# Patient Record
Sex: MALE | Race: WHITE | NOT HISPANIC OR LATINO | Employment: FULL TIME | URBAN - METROPOLITAN AREA
[De-identification: names, ages, dates, MRNs, and addresses within clinical notes are randomized per-mention and may not be internally consistent; named-entity substitution may affect disease eponyms.]

---

## 2019-05-16 ENCOUNTER — HOSPITAL ENCOUNTER (EMERGENCY)
Facility: HOSPITAL | Age: 39
Discharge: HOME | End: 2019-05-16
Attending: EMERGENCY MEDICINE | Admitting: EMERGENCY MEDICINE
Payer: COMMERCIAL

## 2019-05-16 ENCOUNTER — OFFICE VISIT (OUTPATIENT)
Dept: URGENT CARE | Age: 39
End: 2019-05-16
Payer: COMMERCIAL

## 2019-05-16 ENCOUNTER — APPOINTMENT (EMERGENCY)
Dept: ULTRASOUND IMAGING | Facility: HOSPITAL | Age: 39
End: 2019-05-16
Payer: COMMERCIAL

## 2019-05-16 VITALS
RESPIRATION RATE: 16 BRPM | TEMPERATURE: 98.5 F | DIASTOLIC BLOOD PRESSURE: 80 MMHG | OXYGEN SATURATION: 96 % | HEIGHT: 76 IN | BODY MASS INDEX: 24.6 KG/M2 | HEART RATE: 61 BPM | SYSTOLIC BLOOD PRESSURE: 132 MMHG | WEIGHT: 202 LBS

## 2019-05-16 VITALS
RESPIRATION RATE: 16 BRPM | TEMPERATURE: 98.1 F | DIASTOLIC BLOOD PRESSURE: 92 MMHG | HEART RATE: 70 BPM | WEIGHT: 202 LBS | OXYGEN SATURATION: 100 % | BODY MASS INDEX: 24.59 KG/M2 | SYSTOLIC BLOOD PRESSURE: 147 MMHG

## 2019-05-16 DIAGNOSIS — M25.462 SWELLING OF LEFT KNEE JOINT: Primary | ICD-10-CM

## 2019-05-16 DIAGNOSIS — M79.89 PAIN AND SWELLING OF LEFT LOWER LEG: Primary | ICD-10-CM

## 2019-05-16 DIAGNOSIS — M79.662 PAIN AND SWELLING OF LEFT LOWER LEG: Primary | ICD-10-CM

## 2019-05-16 PROCEDURE — 93971 EXTREMITY STUDY: CPT

## 2019-05-16 PROCEDURE — 93971 EXTREMITY STUDY: CPT | Performed by: SURGERY

## 2019-05-16 PROCEDURE — 99203 OFFICE O/P NEW LOW 30 MIN: CPT | Performed by: NURSE PRACTITIONER

## 2019-05-16 PROCEDURE — 99284 EMERGENCY DEPT VISIT MOD MDM: CPT | Performed by: EMERGENCY MEDICINE

## 2019-05-16 PROCEDURE — 99283 EMERGENCY DEPT VISIT LOW MDM: CPT

## 2019-05-16 RX ORDER — PANTOPRAZOLE SODIUM 40 MG/1
40 TABLET, DELAYED RELEASE ORAL
Refills: 0 | COMMUNITY
Start: 2019-02-28 | End: 2020-04-02

## 2020-04-02 DIAGNOSIS — K21.9 GASTRO-ESOPHAGEAL REFLUX DISEASE WITHOUT ESOPHAGITIS: ICD-10-CM

## 2020-04-02 RX ORDER — PANTOPRAZOLE SODIUM 40 MG/1
TABLET, DELAYED RELEASE ORAL
Qty: 21 TABLET | Refills: 0 | Status: SHIPPED | OUTPATIENT
Start: 2020-04-02 | End: 2020-04-20

## 2020-04-18 DIAGNOSIS — K21.9 GASTRO-ESOPHAGEAL REFLUX DISEASE WITHOUT ESOPHAGITIS: ICD-10-CM

## 2020-04-20 RX ORDER — PANTOPRAZOLE SODIUM 40 MG/1
TABLET, DELAYED RELEASE ORAL
Qty: 90 TABLET | Refills: 0 | Status: SHIPPED | OUTPATIENT
Start: 2020-04-20 | End: 2021-11-03

## 2020-05-21 ENCOUNTER — OFFICE VISIT (OUTPATIENT)
Dept: FAMILY MEDICINE CLINIC | Facility: CLINIC | Age: 40
End: 2020-05-21
Payer: COMMERCIAL

## 2020-05-21 VITALS
OXYGEN SATURATION: 98 % | BODY MASS INDEX: 24.96 KG/M2 | DIASTOLIC BLOOD PRESSURE: 84 MMHG | WEIGHT: 205 LBS | SYSTOLIC BLOOD PRESSURE: 124 MMHG | TEMPERATURE: 98.4 F | RESPIRATION RATE: 16 BRPM | HEART RATE: 70 BPM | HEIGHT: 76 IN

## 2020-05-21 DIAGNOSIS — L02.215 PERINEAL ABSCESS: Primary | ICD-10-CM

## 2020-05-21 PROCEDURE — 99213 OFFICE O/P EST LOW 20 MIN: CPT | Performed by: FAMILY MEDICINE

## 2020-05-21 PROCEDURE — 1036F TOBACCO NON-USER: CPT | Performed by: FAMILY MEDICINE

## 2020-05-21 PROCEDURE — 3008F BODY MASS INDEX DOCD: CPT | Performed by: FAMILY MEDICINE

## 2020-05-21 RX ORDER — SULFAMETHOXAZOLE AND TRIMETHOPRIM 800; 160 MG/1; MG/1
1 TABLET ORAL 2 TIMES DAILY
Qty: 14 TABLET | Refills: 0 | Status: SHIPPED | OUTPATIENT
Start: 2020-05-21 | End: 2020-05-28

## 2021-11-03 ENCOUNTER — OFFICE VISIT (OUTPATIENT)
Dept: FAMILY MEDICINE CLINIC | Facility: CLINIC | Age: 41
End: 2021-11-03
Payer: COMMERCIAL

## 2021-11-03 VITALS
DIASTOLIC BLOOD PRESSURE: 74 MMHG | OXYGEN SATURATION: 97 % | WEIGHT: 214 LBS | SYSTOLIC BLOOD PRESSURE: 124 MMHG | BODY MASS INDEX: 26.06 KG/M2 | HEART RATE: 62 BPM | HEIGHT: 76 IN | RESPIRATION RATE: 18 BRPM

## 2021-11-03 DIAGNOSIS — M25.572 LEFT ANKLE PAIN, UNSPECIFIED CHRONICITY: Primary | ICD-10-CM

## 2021-11-03 PROCEDURE — 99213 OFFICE O/P EST LOW 20 MIN: CPT | Performed by: NURSE PRACTITIONER

## 2021-11-03 PROCEDURE — 3725F SCREEN DEPRESSION PERFORMED: CPT | Performed by: NURSE PRACTITIONER

## 2021-11-03 PROCEDURE — 1036F TOBACCO NON-USER: CPT | Performed by: NURSE PRACTITIONER

## 2021-11-03 PROCEDURE — 3008F BODY MASS INDEX DOCD: CPT | Performed by: NURSE PRACTITIONER

## 2023-02-11 ENCOUNTER — OFFICE VISIT (OUTPATIENT)
Dept: URGENT CARE | Facility: CLINIC | Age: 43
End: 2023-02-11

## 2023-02-11 VITALS
OXYGEN SATURATION: 98 % | DIASTOLIC BLOOD PRESSURE: 75 MMHG | BODY MASS INDEX: 27.03 KG/M2 | WEIGHT: 222 LBS | SYSTOLIC BLOOD PRESSURE: 133 MMHG | RESPIRATION RATE: 16 BRPM | TEMPERATURE: 98.5 F | HEART RATE: 68 BPM | HEIGHT: 76 IN

## 2023-02-11 DIAGNOSIS — L01.00 IMPETIGO: Primary | ICD-10-CM

## 2023-02-11 RX ORDER — DOXYCYCLINE 100 MG/1
100 TABLET ORAL 2 TIMES DAILY
Qty: 10 TABLET | Refills: 0 | Status: SHIPPED | OUTPATIENT
Start: 2023-02-11 | End: 2023-02-16

## 2023-02-11 NOTE — PROGRESS NOTES
[facility] [facility]        NAME: Hugo Mendes is a 38 y o  male  : 1980    MRN: [id number]  DATE: 2023  TIME: 11:45 AM    Assessment and Plan   Impetigo [L01 00]  1  Impetigo  doxycycline (ADOXA) 100 MG tablet    mupirocin (BACTROBAN) 2 % ointment            Patient Instructions     --Take oral antibiotic and apply topical antibiotic as prescribed  --Avoid further squeezing  --OTC hydrocortisone cream as needed for itching  --Seek re-evaluation if no resolution/worsening over the next 3-5 days  Chief Complaint     Chief Complaint   Patient presents with   • Abscess     Pt  States that he had a pimple like area on his chin and he popped it  Pt  States that blood and clear fluid came out  Pt  States that he has been putting A&D oint  On it and Vaseline  Pt  States that the area seems to be more red and has a yellow crust to it  History of Present Illness       Here with complaints of "pimple" on chin first noticed 5 days ago, at which time he popped it  Some clear fluid came out  Looking a bit more red, crusty since then  Itchy, mildly tender  No further drainage  No fever  Went to [facility] a couple days ago and they trimmed beard  Wondering if that may have been a factor  Applying A&D ointment  Denies history of skin infections including MRSA  Review of Systems   Review of Systems   Constitutional: Negative for fever  Skin: Positive for rash           Current Medications       Current Outpatient Medications:   •  doxycycline (ADOXA) 100 MG tablet, Take 1 tablet (100 mg total) by mouth 2 (two) times a day for 5 days, Disp: 10 tablet, Rfl: 0  •  mupirocin (BACTROBAN) 2 % ointment, Apply topically 3 (three) times a day, Disp: 22 g, Rfl: 0    Current Allergies     Allergies as of 2023 - Reviewed 2021   Allergen Reaction Noted   • Penicillins Hives 2006            The following portions of the patient's history were reviewed and updated as appropriate: allergies, current medications, past family history, past medical history, past social history, past surgical history and problem list      Past Medical History:   Diagnosis Date   • GERD (gastroesophageal reflux disease)    • IBS (irritable bowel syndrome)        No past surgical history on file  Family History   Problem Relation Age of Onset   • No Known Problems Mother    • No Known Problems Father          Medications have been verified  Objective   /75   Pulse 68   Temp 98 5 °F (36 9 °C)   Resp 16   Ht 6' 4" (1 93 m)   Wt 101 kg (222 lb)   SpO2 98%   BMI 27 02 kg/m²   No LMP for male patient  Physical Exam     Physical Exam  Skin:     Findings: Erythema and rash present  Comments: Chin with 2 x 3 cm oval shaped area of erythematous, mildly raised, indurated, non-tender skin with some overlying elkins crusting  No weeping or drainage noted at this time  No underlying fluctuance  Neurological:      Mental Status: He is alert     Psychiatric:         Mood and Affect: Mood normal

## 2023-02-11 NOTE — PATIENT INSTRUCTIONS
--Take oral antibiotic and apply topical antibiotic as prescribed  --Avoid further squeezing  --OTC hydrocortisone cream as needed for itching  --Seek re-evaluation if no resolution/worsening over the next 3-5 days

## 2024-04-18 ENCOUNTER — TELEPHONE (OUTPATIENT)
Dept: FAMILY MEDICINE CLINIC | Facility: CLINIC | Age: 44
End: 2024-04-18

## 2024-04-18 NOTE — TELEPHONE ENCOUNTER
CAROL to confirm if pt is switching PCP to Dr. Blanco. If not he will need to be rescheduled to get his physical from his PCP, Dr. Veliz

## 2024-04-19 ENCOUNTER — OFFICE VISIT (OUTPATIENT)
Dept: FAMILY MEDICINE CLINIC | Facility: CLINIC | Age: 44
End: 2024-04-19
Payer: COMMERCIAL

## 2024-04-19 VITALS
BODY MASS INDEX: 26.79 KG/M2 | OXYGEN SATURATION: 98 % | WEIGHT: 220 LBS | RESPIRATION RATE: 16 BRPM | SYSTOLIC BLOOD PRESSURE: 150 MMHG | DIASTOLIC BLOOD PRESSURE: 82 MMHG | HEIGHT: 76 IN | HEART RATE: 63 BPM

## 2024-04-19 DIAGNOSIS — R53.83 LOW ENERGY: ICD-10-CM

## 2024-04-19 DIAGNOSIS — Z13.220 SCREENING CHOLESTEROL LEVEL: ICD-10-CM

## 2024-04-19 DIAGNOSIS — Z13.1 SCREENING FOR DIABETES MELLITUS: ICD-10-CM

## 2024-04-19 DIAGNOSIS — Z13.0 SCREENING FOR DEFICIENCY ANEMIA: ICD-10-CM

## 2024-04-19 DIAGNOSIS — Z00.00 ENCOUNTER FOR PREVENTATIVE ADULT HEALTH CARE EXAMINATION: Primary | ICD-10-CM

## 2024-04-19 PROBLEM — M25.572 LEFT ANKLE PAIN: Status: RESOLVED | Noted: 2021-11-03 | Resolved: 2024-04-19

## 2024-04-19 PROCEDURE — 99396 PREV VISIT EST AGE 40-64: CPT | Performed by: FAMILY MEDICINE

## 2024-04-19 NOTE — PROGRESS NOTES
ADULT ANNUAL PHYSICAL  [facility]    NAME: Viral Del Real  AGE: 43 y.o. SEX: male  : 1980     DATE: 2024     Assessment and Plan:     Problem List Items Addressed This Visit    None  Visit Diagnoses       Encounter for preventative adult health care examination    -  Primary    Relevant Orders    CBC and differential    Comprehensive metabolic panel    Testosterone, free, total    Screening cholesterol level        Relevant Orders    Lipid panel    Screening for diabetes mellitus        Relevant Orders    Comprehensive metabolic panel    Screening for deficiency anemia        Relevant Orders    CBC and differential    Low energy        Relevant Orders    Testosterone, free, total          Patient is typically followed by Dr. Veliz.  He comes here for a preventative health examination.  Lab work will review for cell lines, kidney function, liver function, electrolytes, fasting sugar, and cholesterol.  Patient also requested a testosterone be placed.  We briefly went over the pros and cons of exogenous testosterone, he still would like testing.  He will need to follow-up with Dr. Veliz.    Immunizations and preventive care screenings were discussed with patient today. Appropriate education was printed on patient's after visit summary.    Discussed risks and benefits of prostate cancer screening. We discussed the controversial history of PSA screening for prostate cancer in the [place] as well as the risk of over detection and over treatment of prostate cancer by way of PSA screening.  The patient understands that PSA blood testing is an imperfect way to screen for prostate cancer and that elevated PSA levels in the blood may also be caused by infection, inflammation, prostatic trauma or manipulation, urological procedures, or by benign prostatic enlargement.    The role of the digital rectal examination in prostate cancer screening was also  discussed and I discussed with him that there is large interobserver variability in the findings of digital rectal examination.    Counseling:  Alcohol/drug use: discussed moderation in alcohol intake, the recommendations for healthy alcohol use, and avoidance of illicit drug use.  Dental Health: discussed importance of regular tooth brushing, flossing, and dental visits.  Injury prevention: discussed safety/seat belts, safety helmets, smoke detectors, carbon dioxide detectors, and smoking near bedding or upholstery.  Sexual health: discussed sexually transmitted diseases, partner selection, use of condoms, avoidance of unintended pregnancy, and contraceptive alternatives.  Exercise: the importance of regular exercise/physical activity was discussed. Recommend exercise 3-5 times per week for at least 30 minutes.          No follow-ups on file.     Chief Complaint:     Chief Complaint   Patient presents with    Annual Exam      History of Present Illness:     Adult Annual Physical   Patient here for a comprehensive physical exam. The patient reports no problems.  Patient presents today to obtain lab work/proper screening for his age.  He is now greater than 40 and his significant other is requesting that he be evaluated.  Overall he is a healthy individual and has been starting to work out more.  He consumes healthy food on a regular basis.  His only major concern is an area on the right side of his parietal region, there is a small lesion that he cannot see and would like it evaluated.    Diet and Physical Activity  Diet/Nutrition: well balanced diet and consuming 3-5 servings of fruits/vegetables daily.   Exercise: 3-4 times a week on average and 30-60 minutes on average.      Depression Screening  PHQ-2/9 Depression Screening    Little interest or pleasure in doing things: 0 - not at all  Feeling down, depressed, or hopeless: 0 - not at all  PHQ-2 Score: 0  PHQ-2 Interpretation: Negative depression screen        General Health  Sleep: sleeps well and gets 7-8 hours of sleep on average.   Hearing: normal - bilateral.  Vision: goes for regular eye exams and wears glasses.   Dental: no dental visits for >1 year.        Health  Symptoms include: none     Review of Systems:     Review of Systems   Constitutional:  Negative for chills, fever and unexpected weight change.   HENT:  Negative for congestion, ear discharge, ear pain, postnasal drip, rhinorrhea, sinus pressure, sinus pain and sore throat.    Eyes:  Positive for visual disturbance.   Respiratory:  Negative for cough, chest tightness and shortness of breath.    Cardiovascular:  Negative for chest pain and palpitations.   Gastrointestinal:  Positive for diarrhea. Negative for abdominal distention, abdominal pain, blood in stool, constipation, nausea and vomiting. Rectal pain: hx of IBS.  Genitourinary:  Negative for dysuria and frequency.   Neurological:  Positive for headaches (occasionally, stress). Negative for dizziness and light-headedness.   Psychiatric/Behavioral:  Negative for self-injury and suicidal ideas.       Past Medical History:     Past Medical History:   Diagnosis Date    GERD (gastroesophageal reflux disease)     IBS (irritable bowel syndrome)       Past Surgical History:     Past Surgical History:   Procedure Laterality Date    NO PAST SURGERIES        Family History:     Family History   Problem Relation Age of Onset    Clotting disorder Mother     Hypertension Father     Throat cancer Maternal Grandfather 88    Colon cancer Paternal Grandmother 80    Prostate cancer Neg Hx     Testicular cancer Neg Hx       Social History:     Social History     Socioeconomic History    Marital status: Single     Spouse name: None    Number of children: None    Years of education: None    Highest education level: None   Occupational History    Occupation:    Tobacco Use    Smoking status: Never    Smokeless tobacco: Never   Vaping Use    Vaping  "status: Never Used   Substance and Sexual Activity    Alcohol use: Yes     Comment: 2x per month, not weekly.    Drug use: Never    Sexual activity: Yes     Partners: Female   Other Topics Concern    None   Social History Narrative    · Most recent tobacco use screenin2017      · Do you currently or have you served in the [employer]:   No      · Were you activated, into active duty, as a member of the [employer] or as a Reservist:   No      · Exercise level:   Occasional      · Diet:   Regular      · Caffeine intake:   Occasional      · Seat belts used routinely:   Yes      · Sunscreen used routinely:   Yes      · Smoke alarm in home:   Yes      · Advance directive:   No      · Salt Intake:   adds salt to food     As per [place]      Social Determinants of Health     Financial Resource Strain: Not on file   Food Insecurity: Not on file   Transportation Needs: Not on file   Physical Activity: Not on file   Stress: Not on file   Social Connections: Not on file   Intimate Partner Violence: Not on file   Housing Stability: Not on file      Current Medications:     No current outpatient medications on file.     No current facility-administered medications for this visit.      Allergies:     Allergies   Allergen Reactions    Penicillins Hives     Reaction Date: 2006;         Physical Exam:     /82   Pulse 63   Resp 16   Ht 6' 4\" (1.93 m)   Wt 99.8 kg (220 lb)   SpO2 98%   BMI 26.78 kg/m²     Physical Exam  Constitutional:       General: He is not in acute distress.     Appearance: Normal appearance. He is normal weight. He is not ill-appearing, toxic-appearing or diaphoretic.   HENT:      Head: Normocephalic and atraumatic.      Right Ear: Tympanic membrane, ear canal and external ear normal. There is no impacted cerumen.      Left Ear: Tympanic membrane, ear canal and external ear normal. There is no impacted cerumen.      Nose: Nose normal. No congestion or rhinorrhea.      " Mouth/Throat:      Mouth: Mucous membranes are moist.      Pharynx: Oropharynx is clear. No oropharyngeal exudate or posterior oropharyngeal erythema.   Eyes:      General:         Right eye: No discharge.         Left eye: No discharge.      Extraocular Movements: Extraocular movements intact.      Pupils: Pupils are equal, round, and reactive to light.   Cardiovascular:      Rate and Rhythm: Normal rate and regular rhythm.      Heart sounds: Normal heart sounds. No murmur heard.     No friction rub. No gallop.   Pulmonary:      Effort: Pulmonary effort is normal. No respiratory distress.      Breath sounds: Normal breath sounds. No stridor. No wheezing, rhonchi or rales.   Abdominal:      General: Bowel sounds are normal. There is no distension.      Palpations: Abdomen is soft.      Tenderness: There is no abdominal tenderness.   Musculoskeletal:      Cervical back: Neck supple. No tenderness.   Lymphadenopathy:      Cervical: No cervical adenopathy.   Skin:     General: Skin is warm.      Capillary Refill: Capillary refill takes less than 2 seconds.      Findings: Lesion (right, posterior, parital region) present.   Neurological:      Mental Status: He is alert.      Sensory: No sensory deficit (light touch in all 4 extremities).      Motor: No weakness (5/5 in all 4 extremities).      Deep Tendon Reflexes: Reflexes normal (2/4, intact, C5, C6, L4, S1).          Mac Blanco,   [facility]

## 2024-04-27 LAB
ALBUMIN SERPL-MCNC: 4.6 G/DL (ref 4.1–5.1)
ALBUMIN/GLOB SERPL: 1.5 {RATIO} (ref 1.2–2.2)
ALP SERPL-CCNC: 67 IU/L (ref 44–121)
ALT SERPL-CCNC: 18 IU/L (ref 0–44)
AST SERPL-CCNC: 25 IU/L (ref 0–40)
BASOPHILS # BLD AUTO: 0 X10E3/UL (ref 0–0.2)
BASOPHILS NFR BLD AUTO: 0 %
BILIRUB SERPL-MCNC: 0.4 MG/DL (ref 0–1.2)
BUN SERPL-MCNC: 16 MG/DL (ref 6–24)
BUN/CREAT SERPL: 15 (ref 9–20)
CALCIUM SERPL-MCNC: 9.4 MG/DL (ref 8.7–10.2)
CHLORIDE SERPL-SCNC: 101 MMOL/L (ref 96–106)
CHOLEST SERPL-MCNC: 197 MG/DL (ref 100–199)
CO2 SERPL-SCNC: 22 MMOL/L (ref 20–29)
CREAT SERPL-MCNC: 1.09 MG/DL (ref 0.76–1.27)
EGFR: 87 ML/MIN/1.73
EOSINOPHIL # BLD AUTO: 0.4 X10E3/UL (ref 0–0.4)
EOSINOPHIL NFR BLD AUTO: 5 %
ERYTHROCYTE [DISTWIDTH] IN BLOOD BY AUTOMATED COUNT: 12.6 % (ref 11.6–15.4)
GLOBULIN SER-MCNC: 3 G/DL (ref 1.5–4.5)
GLUCOSE SERPL-MCNC: 92 MG/DL (ref 70–99)
HCT VFR BLD AUTO: 49.7 % (ref 37.5–51)
HDLC SERPL-MCNC: 34 MG/DL
HGB BLD-MCNC: 16.6 G/DL (ref 13–17.7)
IMM GRANULOCYTES # BLD: 0 X10E3/UL (ref 0–0.1)
IMM GRANULOCYTES NFR BLD: 0 %
LDLC SERPL CALC-MCNC: 105 MG/DL (ref 0–99)
LYMPHOCYTES # BLD AUTO: 2.6 X10E3/UL (ref 0.7–3.1)
LYMPHOCYTES NFR BLD AUTO: 34 %
MCH RBC QN AUTO: 30.2 PG (ref 26.6–33)
MCHC RBC AUTO-ENTMCNC: 33.4 G/DL (ref 31.5–35.7)
MCV RBC AUTO: 90 FL (ref 79–97)
MONOCYTES # BLD AUTO: 0.8 X10E3/UL (ref 0.1–0.9)
MONOCYTES NFR BLD AUTO: 11 %
NEUTROPHILS # BLD AUTO: 3.8 X10E3/UL (ref 1.4–7)
NEUTROPHILS NFR BLD AUTO: 50 %
PLATELET # BLD AUTO: 265 X10E3/UL (ref 150–450)
POTASSIUM SERPL-SCNC: 4.3 MMOL/L (ref 3.5–5.2)
PROT SERPL-MCNC: 7.6 G/DL (ref 6–8.5)
RBC # BLD AUTO: 5.5 X10E6/UL (ref 4.14–5.8)
SL AMB VLDL CHOLESTEROL CALC: 58 MG/DL (ref 5–40)
SODIUM SERPL-SCNC: 139 MMOL/L (ref 134–144)
TESTOST FREE SERPL-MCNC: 12 PG/ML (ref 6.8–21.5)
TESTOST SERPL-MCNC: 464 NG/DL (ref 264–916)
TRIGL SERPL-MCNC: 341 MG/DL (ref 0–149)
WBC # BLD AUTO: 7.7 X10E3/UL (ref 3.4–10.8)

## 2024-05-02 ENCOUNTER — TELEPHONE (OUTPATIENT)
Dept: FAMILY MEDICINE CLINIC | Facility: CLINIC | Age: 44
End: 2024-05-02

## 2024-05-02 NOTE — TELEPHONE ENCOUNTER
Patient informed him that I will be sending a [employer] message with further details.  Thankfully most of his labs are within normal limits, although his cholesterol especially his triglycerides are significantly elevated.  I told him he could either reach out via Nativo or follow-up with his primary care provider, Dr. Veliz, in the office.

## 2024-05-17 ENCOUNTER — OFFICE VISIT (OUTPATIENT)
Dept: FAMILY MEDICINE CLINIC | Facility: CLINIC | Age: 44
End: 2024-05-17

## 2024-05-17 VITALS
OXYGEN SATURATION: 98 % | DIASTOLIC BLOOD PRESSURE: 82 MMHG | BODY MASS INDEX: 26.42 KG/M2 | WEIGHT: 217 LBS | HEIGHT: 76 IN | SYSTOLIC BLOOD PRESSURE: 136 MMHG | HEART RATE: 79 BPM | RESPIRATION RATE: 16 BRPM

## 2024-05-17 DIAGNOSIS — E55.9 VITAMIN D DEFICIENCY: ICD-10-CM

## 2024-05-17 DIAGNOSIS — E78.2 MIXED HYPERLIPIDEMIA: Primary | ICD-10-CM

## 2024-05-17 NOTE — PROGRESS NOTES
Ambulatory Visit  Name: Viral Del Real      : 1980      MRN: [id number]  Encounter Provider: Paul Veliz MD  Encounter Date: 2024   Encounter department: [facility]    Assessment & Plan  1. Annual physical examination.  The patient was counseled to maintain a healthy diet, ensure adequate hydration, and maintain regular appointments with the ophthalmologist and dentist.    2. Potential lipoma or folliculitis.  The nodule could potentially be a lipoma.    Follow-up  The patient is scheduled for a follow-up visit in 3 months, during which his vitamin D levels will be reassessed.  No orders of the defined types were placed in this encounter.         History of Present Illness     History of Present Illness  The patient is a 43-year-old male who is here for follow-up physical.    The patient's recent blood work revealed elevated triglyceride levels, which he attributes to a lack of dietary habits for a period of 2.5 to 3 weeks prior to his blood work. He attributes this dietary change to a shoulder injury sustained during a gym workout and subsequent carbohydrate overload. He is contemplating whether fish oil supplementation could be beneficial.    The patient expresses a desire to have his vitamin D levels assessed, noting that they were previously low a few years ago.    The patient continues to experience irritable bowel syndrome (IBS), which tends to exacerbate his condition when he consumes fried foods. However, he notes a significant improvement in his condition. He is currently on a probiotic regimen, which appears to be beneficial. His bowel function has improved, although he experiences loose stools or diarrhea if he consumes certain foods.    The patient's acid reflux symptoms have resolved, and he has discontinued the prescribed medication. He occasionally experiences mild acid reflux after consuming spicy food at night, which he manages with Tums.    The patient has a  "longstanding nodule on his head, first noticed approximately 2 years ago following a short haircut. The nodule has remained stable in size.    Supplemental Information  He denies palpitations, chest pain, asthma, allergy, wheezing, erectile dysfunction, urinary problems, discharge, lesions, lower back pain, hip pain, or knee pain. He is planning a vacation in 07/2024.     Review of Systems   Constitutional:  Negative for activity change, appetite change and fever.   HENT:  Negative for congestion, nosebleeds and trouble swallowing.    Eyes:  Negative for itching.   Respiratory:  Negative for cough and chest tightness.    Cardiovascular:  Negative for chest pain and palpitations.   Gastrointestinal:  Negative for abdominal pain, constipation, diarrhea and nausea.   Endocrine: Negative for cold intolerance.   Genitourinary:  Negative for frequency.   Musculoskeletal:  Negative for gait problem and joint swelling.   Skin:  Negative for rash.   Allergic/Immunologic: Negative for immunocompromised state.   Neurological:  Negative for dizziness, tremors, seizures, syncope and headaches.   Psychiatric/Behavioral:  Negative for hallucinations and suicidal ideas.      Objective     /82 (BP Location: Left arm, Patient Position: Sitting, Cuff Size: Standard)   Pulse 79   Resp 16   Ht 6' 4\" (1.93 m)   Wt 98.4 kg (217 lb)   SpO2 98%   BMI 26.41 kg/m²     Physical Exam  Lesion noted in the right posterior quadrant of the head.  Physical Exam  Vitals and nursing note reviewed.   Constitutional:       Appearance: He is well-developed.   HENT:      Head: Normocephalic and atraumatic.   Eyes:      Conjunctiva/sclera: Conjunctivae normal.      Pupils: Pupils are equal, round, and reactive to light.   Cardiovascular:      Rate and Rhythm: Normal rate and regular rhythm.      Heart sounds: Normal heart sounds.   Pulmonary:      Effort: Pulmonary effort is normal.      Breath sounds: Normal breath sounds. No wheezing or " rales.   Abdominal:      General: Bowel sounds are normal. There is no distension.      Palpations: Abdomen is soft.      Tenderness: There is no abdominal tenderness.   Musculoskeletal:         General: No tenderness. Normal range of motion.      Cervical back: Normal range of motion and neck supple.   Skin:     General: Skin is warm and dry.      Findings: No rash.   Neurological:      Mental Status: He is alert and oriented to person, place, and time.      Cranial Nerves: No cranial nerve deficit.      Sensory: No sensory deficit.      Coordination: Coordination normal.   Psychiatric:         Behavior: Behavior normal.         Thought Content: Thought content normal.         Judgment: Judgment normal.       Administrative Statements

## 2024-09-05 ENCOUNTER — HOSPITAL ENCOUNTER (EMERGENCY)
Facility: HOSPITAL | Age: 44
Discharge: HOME | End: 2024-09-05
Attending: EMERGENCY MEDICINE
Payer: COMMERCIAL

## 2024-09-05 ENCOUNTER — OFFICE VISIT (OUTPATIENT)
Dept: URGENT CARE | Facility: MEDICAL CENTER | Age: 44
End: 2024-09-05
Payer: COMMERCIAL

## 2024-09-05 ENCOUNTER — APPOINTMENT (EMERGENCY)
Dept: NON INVASIVE DIAGNOSTICS | Facility: HOSPITAL | Age: 44
End: 2024-09-05
Payer: COMMERCIAL

## 2024-09-05 VITALS
SYSTOLIC BLOOD PRESSURE: 143 MMHG | DIASTOLIC BLOOD PRESSURE: 95 MMHG | TEMPERATURE: 98.2 F | WEIGHT: 220.2 LBS | HEIGHT: 76 IN | HEART RATE: 69 BPM | RESPIRATION RATE: 20 BRPM | OXYGEN SATURATION: 98 % | BODY MASS INDEX: 26.81 KG/M2

## 2024-09-05 VITALS
HEART RATE: 67 BPM | OXYGEN SATURATION: 98 % | DIASTOLIC BLOOD PRESSURE: 84 MMHG | TEMPERATURE: 98.4 F | SYSTOLIC BLOOD PRESSURE: 141 MMHG | RESPIRATION RATE: 18 BRPM | WEIGHT: 218.7 LBS | BODY MASS INDEX: 26.62 KG/M2

## 2024-09-05 DIAGNOSIS — M79.605 LEFT LEG PAIN: Primary | ICD-10-CM

## 2024-09-05 PROCEDURE — 99283 EMERGENCY DEPT VISIT LOW MDM: CPT

## 2024-09-05 PROCEDURE — 93971 EXTREMITY STUDY: CPT | Performed by: SURGERY

## 2024-09-05 PROCEDURE — 93971 EXTREMITY STUDY: CPT

## 2024-09-05 PROCEDURE — 99284 EMERGENCY DEPT VISIT MOD MDM: CPT | Performed by: EMERGENCY MEDICINE

## 2024-09-05 PROCEDURE — 99213 OFFICE O/P EST LOW 20 MIN: CPT | Performed by: FAMILY MEDICINE

## 2024-09-05 NOTE — Clinical Note
Viral Del Real was seen and treated in our emergency department on 9/5/2024.                Diagnosis:     Viral  may return to work on return date.    He may return on this date: 09/06/2024         If you have any questions or concerns, please don't hesitate to call.      Charly Baron MD    ______________________________           _______________          _______________  Hospital Representative                              Date                                Time

## 2024-09-05 NOTE — PROGRESS NOTES
[facility]        NAME: Viral Del Real is a 44 y.o. male  : 1980    MRN: [id number]  DATE: 2024  TIME: 11:02 AM    Assessment and Plan   Left leg pain [M79.605]  1. Left leg pain  Transfer to other facility            Patient Instructions       Follow up with PCP in 3-5 days.  Proceed to  ER if symptoms worsen.    If tests have been performed at [facility], our office will contact you with results if changes need to be made to the care plan discussed with you at the visit.  [facility]    Chief Complaint     Chief Complaint   Patient presents with    thigh pain     Pt c/o tender area on left lateral thigh for the past 2 weeks.  Denies pain w/ambulation - just pain mostly with touch or when phone in pocket rubs against it.  States he did bang his leg in that area ~ 3-4 weeks ago         History of Present Illness       44-year-old male here today with complaint of left lateral thigh pain for the past 2 weeks.  Pain is only present upon palpation not aggravated by walking.  Upon further questioning, he informs me that approximately 3 to 4 weeks ago he noticed tenderness in feeding bruised in the left upper outer thigh but cannot recall any recent history of trauma or fall to this area.  Denies pain is significant enough to take Tylenol ibuprofen.  Denies concern because he feels the lump on the left outer thigh which is tender.  Upon further questioning denies any recent long travel in car or airplane.  Past medical history noncontributory.  Pain when present, is 4 out of 10.        Review of Systems   Review of Systems   Musculoskeletal:  Positive for myalgias.         Current Medications     No current outpatient medications on file.    Current Allergies     Allergies as of 2024 - Reviewed 2024   Allergen Reaction Noted    Penicillins Hives 2006            The following portions of the patient's history were reviewed and  "updated as appropriate: allergies, current medications, past family history, past medical history, past social history, past surgical history and problem list.     Past Medical History:   Diagnosis Date    GERD (gastroesophageal reflux disease)     IBS (irritable bowel syndrome)        Past Surgical History:   Procedure Laterality Date    NO PAST SURGERIES         Family History   Problem Relation Age of Onset    Clotting disorder Mother     Hypertension Father     Throat cancer Maternal Grandfather 88    Colon cancer Paternal Grandmother 80    Prostate cancer Neg Hx     Testicular cancer Neg Hx          Medications have been verified.        Objective   /95   Pulse 69   Temp 98.2 °F (36.8 °C) (Tympanic)   Resp 20   Ht 6' 4\" (1.93 m)   Wt 99.9 kg (220 lb 3.2 oz)   SpO2 98%   BMI 26.80 kg/m²   No LMP for male patient.       Physical Exam     Physical Exam  Nursing note reviewed.   Constitutional:       Appearance: Normal appearance.   Musculoskeletal:         General: Tenderness present.      Comments: Left lower extremity: Reveals full range of motion.  Upon exam, there is some tenderness over the upper left lateral thigh quadricep.  There is no ecchymosis.  No palpable nodules are appreciated.  No evidence of pitting edema of either the right or left lower extremity.   Neurological:      Mental Status: He is alert.                   "

## 2024-09-05 NOTE — PATIENT INSTRUCTIONS
"Vital signs are stable.  I have referred the patient to [facility] emergency room for further evaluation.  Transported private vehicle in stable condition.    Patient Education     Superficial vein phlebitis and thrombosis   The Basics   Written by the doctors and editors at [facility]   What is superficial vein phlebitis/thrombosis? -- These are medical terms for problems with veins called the \"superficial\" veins. The superficial veins are closer to the surface of the skin than the \"deep\" veins. They are found throughout the body. Problems can include:   Superficial vein phlebitis - This is when the veins get inflamed.   Superficial vein thrombosis - This is when blood clots form in the veins.  If both problems happen, it is called \"superficial vein thrombophlebitis.\"  Superficial vein phlebitis/thrombosis is related to another vein problem called \"deep vein thrombosis\" (\"DVT\"). DVT is when 1 of the deep veins gets inflamed and has blood clots. These veins are found deeper under the skin. They are beneath the fatty tissues and between, or within, the muscle tissues.  DVT can be very dangerous because clots within a deep vein can break off and travel to the lungs, causing something called a \"pulmonary embolism.\" But DVT is different from superficial vein phlebitis/thrombosis, which affects veins closer to the surface of the skin.  Is superficial vein phlebitis/thrombosis dangerous? -- Superficial vein phlebitis is not usually dangerous. But superficial vein thrombosis can lead to problems. Clots in a superficial vein can:   Extend into a deep vein, causing DVT   Break off, causing pulmonary embolism  For these reasons, superficial vein thrombosis is taken very seriously. The risk of problems is higher when it affects the places where superficial and deep veins meet, like in the upper thigh, back of the knee, or upper arm.  How does superficial vein phlebitis/thrombosis relate to other vein " "problems? -- People who get superficial vein phlebitis/thrombosis often also have a type of vein disease called \"venous insufficiency.\"  Venous insufficiency is a problem with blood flow in the veins. It most often affects the legs. When the veins are healthy and working normally, they carry blood in only 1 direction, from the arms and legs back to the heart. Veins have valves inside them to keep blood moving toward the heart. The valves open to let blood flow to the heart, and close to keep blood from flowing backward. With venous insufficiency, the valves are damaged or do not work well. Because of this, blood flows backward and collects in the veins. Some people also have varicose veins (twisted, swollen veins).  People without venous insufficiency can also get superficial vein phlebitis/thrombosis. This usually happens after having an \"intravenous catheter,\" which is a tube that goes into a vein to give medicines. But even people who had nothing put into a vein can get superficial vein phlebitis/thrombosis. For example, it can happen to people with blood clotting problems or cancer.  What are the symptoms of superficial vein phlebitis/thrombosis? -- The symptoms include:   Pain, tenderness, or redness along the length of a vein   Thickening of the vein   Fever   Pus draining from the area where a catheter was put in   Swelling of the affected arm or leg  Should I see a doctor or nurse? -- Yes, if you have symptoms of superficial vein phlebitis/thrombosis, see your doctor or nurse. See them right away if the affected arm or leg is swollen, or if the affected vein is in the thigh, behind the knee, or in or upper arm.  Call for an ambulance (in the [place] and [place], call 9-1-1) if you get symptoms of a blood clot in the lungs, such as:   Panting or trouble breathing   Sharp, knife-like chest pain when you breathe in   Coughing, or coughing up blood   Rapid heartbeat  Will I need tests? -- Maybe. Your doctor or nurse " "might be able to tell what is happening by doing an exam and learning about your symptoms. They might also do a test called an ultrasound. An ultrasound can:   Show if any of the veins are blocked, especially the deep veins   Show if the blockage is recent or older   Check how well the valves in the veins work  In some cases, your doctor might order blood tests.  How is superficial vein phlebitis/thrombosis treated? -- Treatment is aimed at easing your symptoms. To do this, doctors recommend that you:   Use heating or cooling pads on the area.   Raise the arm or leg - You can prop it up on pillows or a chair when resting.   Take a medicine called an NSAID - Examples include ibuprofen (sample brand names: Advil, Motrin) and naproxen (sample brand names: Aleve, Naprosyn).  If your superficial vein phlebitis/thrombosis is near where you have (or had) an intravenous catheter, your doctor will check for infection. If you do have an infection, you might need antibiotics.  If superficial vein phlebitis/thrombosis is in your leg, your doctor or nurse might also suggest that you wear compression stockings. These are special socks that fit tightly over the ankle and leg. If your doctor or nurse recommends them, they will tell you which type to wear and how to put them on.  Some people do not need any other treatment. In some cases where superficial vein thrombosis is near the deep veins, doctors will prescribe an anti-clotting medicine. These medicines are sometimes called \"blood thinners.\" They help prevent more clots from forming.  Can superficial vein phlebitis/thrombosis be prevented? -- You can reduce your chances of getting these problems in the leg veins by staying active and not sitting too long without moving.  If your doctor needs to use a catheter to give you medicine through a vein in your arm, this can sometimes lead to superficial vein phlebitis/thrombosis. Your doctor can lower this risk by removing the " catheter as soon as it is no longer needed.  All topics are updated as new evidence becomes available and our peer review process is complete.  This topic retrieved from [employer] on: Feb 26, 2024.  Topic 71123 Version 11.0  Release: 32.2.4 - C32.56  © 2024 UpToDate, Inc. and/or its affiliates. All rights reserved.  Consumer Information Use and Disclaimer   Disclaimer: This generalized information is a limited summary of diagnosis, treatment, and/or medication information. It is not meant to be comprehensive and should be used as a tool to help the user understand and/or assess potential diagnostic and treatment options. It does NOT include all information about conditions, treatments, medications, side effects, or risks that may apply to a specific patient. It is not intended to be medical advice or a substitute for the medical advice, diagnosis, or treatment of a health care provider based on the health care provider's examination and assessment of a patient's specific and unique circumstances. Patients must speak with a health care provider for complete information about their health, medical questions, and treatment options, including any risks or benefits regarding use of medications. This information does not endorse any treatments or medications as safe, effective, or approved for treating a specific patient. UpToDate, Inc. and its affiliates disclaim any warranty or liability relating to this information or the use thereof.The use of this information is governed by the Terms of Use, available at https://www.woltersVinsulauwer.com/en/know/clinical-effectiveness-terms. 2024© UpToDate, Inc. and its affiliates and/or licensors. All rights reserved.  Copyright   © 2024 UpToDate, Inc. and/or its affiliates. All rights reserved.

## 2024-09-05 NOTE — ED PROVIDER NOTES
History  Chief Complaint   Patient presents with    Leg Pain     Patient reporting left upper thigh pain for about a month. States sent here from Urgent care for an [place]. Denies any other complaints.     Viral is a pleasant 44-year-old male here for evaluation of left pain/discomfort.  He was sent from urgent care today.  He states that a few weeks ago he was drinking with some friends and woke up with a bruise on his left thigh.  It was sore for a few weeks and eventually resolved.  He continued to have some soreness in the area so he went to urgent care today.  Urgent care sent him in due to concern for DVT.  Patient denies any calf swelling, pain, or tenderness.  He did take a road trip to [place] a few weeks ago.  No other DVT risk factors.  No chest pain or shortness of breath.  Past medical history of IBS and GERD.  Denies other medical problems.      History provided by:  Patient  Leg Pain  Associated symptoms: no back pain and no fever        None       Past Medical History:   Diagnosis Date    GERD (gastroesophageal reflux disease)     IBS (irritable bowel syndrome)        Past Surgical History:   Procedure Laterality Date    NO PAST SURGERIES         Family History   Problem Relation Age of Onset    Clotting disorder Mother     Hypertension Father     Throat cancer Maternal Grandfather 88    Colon cancer Paternal Grandmother 80    Prostate cancer Neg Hx     Testicular cancer Neg Hx      I have reviewed and agree with the history as documented.    E-Cigarette/Vaping    E-Cigarette Use Never User      E-Cigarette/Vaping Substances     Social History     Tobacco Use    Smoking status: Never    Smokeless tobacco: Never   Vaping Use    Vaping status: Never Used   Substance Use Topics    Alcohol use: Yes     Comment: 2x per month, not weekly.    Drug use: Never       Review of Systems   Constitutional:  Negative for chills and fever.   HENT:  Negative for ear pain and sore throat.    Eyes:  Negative for  visual disturbance.   Respiratory:  Negative for cough and shortness of breath.    Cardiovascular:  Negative for chest pain and palpitations.   Gastrointestinal:  Negative for abdominal pain, nausea and vomiting.   Genitourinary:  Negative for dysuria and hematuria.   Musculoskeletal:  Positive for myalgias. Negative for arthralgias and back pain.   Skin:  Negative for color change and rash.   Neurological:  Negative for syncope.   All other systems reviewed and are negative.      Physical Exam  Physical Exam  Vitals and nursing note reviewed.   Constitutional:       General: He is not in acute distress.     Appearance: He is well-developed.   HENT:      Head: Normocephalic and atraumatic.   Eyes:      Conjunctiva/sclera: Conjunctivae normal.   Cardiovascular:      Rate and Rhythm: Normal rate and regular rhythm.      Heart sounds: No murmur heard.  Pulmonary:      Effort: Pulmonary effort is normal. No respiratory distress.      Breath sounds: Normal breath sounds.   Abdominal:      Palpations: Abdomen is soft.      Tenderness: There is no abdominal tenderness.   Musculoskeletal:         General: Tenderness present. No swelling.      Cervical back: Neck supple.      Comments: There is mild tenderness to the left lateral thigh with no appreciable mass.  No bruising or skin changes.  Exam of the left lower leg is unremarkable.  There is no significant calf swelling or tenderness.  No skin changes or palpable cords.  Normal range of motion, distal pulses, sensation, and cap refill.   Skin:     General: Skin is warm and dry.      Capillary Refill: Capillary refill takes less than 2 seconds.   Neurological:      General: No focal deficit present.      Mental Status: He is alert and oriented to person, place, and time.   Psychiatric:         Mood and Affect: Mood normal.         Vital Signs  ED Triage Vitals [09/05/24 1102]   Temperature Pulse Respirations Blood Pressure SpO2   98.4 °F (36.9 °C) 67 18 141/84 98 %       Temp Source Heart Rate Source Patient Position - Orthostatic VS BP Location FiO2 (%)   Oral -- -- -- --      Pain Score       --           Vitals:    09/05/24 1102   BP: 141/84   Pulse: 67         Visual Acuity      ED Medications  Medications - No data to display    Diagnostic Studies  Results Reviewed       None                   VAS VENOUS DUPLEX -LOWER LIMB UNILATERAL    (Results Pending)              Procedures  Procedures         ED Course                                 SBIRT 20yo+      Flowsheet Row Most Recent Value   Initial Alcohol Screen: US AUDIT-C     1. How often do you have a drink containing alcohol? 0 Filed at: 09/05/2024 1130   2. How many drinks containing alcohol do you have on a typical day you are drinking?  0 Filed at: 09/05/2024 1130   3a. Male UNDER 65: How often do you have five or more drinks on one occasion? 0 Filed at: 09/05/2024 1130   Audit-C Score 0 Filed at: 09/05/2024 1130   REBECCA: How many times in the past year have you...    Used an illegal drug or used a prescription medication for non-medical reasons? Never Filed at: 09/05/2024 1130                      Medical Decision Making  44-year-old male sent from urgent care to rule out DVT of left lower extremity.  Reassuring physical exam and vital signs.  Venous duplex of the left lower extremity is reassuring.  Plan for discharge with outpatient follow-up and return precautions.                 Disposition  Final diagnoses:   Left leg pain     Time reflects when diagnosis was documented in both MDM as applicable and the Disposition within this note       Time User Action Codes Description Comment    9/5/2024 12:21 PM Charly Baron Add [M79.605] Left leg pain           ED Disposition       ED Disposition   Discharge    Condition   Stable    Date/Time   Thu Sep 5, 2024 1221    Comment   Viral Del Real discharge to home/self care.                   Follow-up Information       Follow up With Specialties Details Why Contact Info     Paul Veliz MD Family Medicine   [address] [address]  [phone]              Patient's Medications   Discharge Prescriptions    NAPROXEN (NAPROSYN) 375 MG TABLET    Take 1 tablet (375 mg total) by mouth 2 (two) times a day with meals       Start Date: 9/5/2024  End Date: --       Order Dose: 375 mg       Quantity: 20 tablet    Refills: 0       No discharge procedures on file.    PDMP Review       None            ED Provider  Electronically Signed by             Charly Baron MD  09/05/24 8181

## 2025-06-17 ENCOUNTER — OFFICE VISIT (OUTPATIENT)
Dept: FAMILY MEDICINE CLINIC | Facility: CLINIC | Age: 45
End: 2025-06-17
Payer: COMMERCIAL

## 2025-06-17 VITALS
SYSTOLIC BLOOD PRESSURE: 140 MMHG | WEIGHT: 227 LBS | HEART RATE: 84 BPM | OXYGEN SATURATION: 98 % | HEIGHT: 76 IN | DIASTOLIC BLOOD PRESSURE: 90 MMHG | BODY MASS INDEX: 27.64 KG/M2

## 2025-06-17 DIAGNOSIS — Z00.00 WELL ADULT EXAM: ICD-10-CM

## 2025-06-17 DIAGNOSIS — R30.0 DYSURIA: ICD-10-CM

## 2025-06-17 DIAGNOSIS — E78.2 MIXED HYPERLIPIDEMIA: ICD-10-CM

## 2025-06-17 DIAGNOSIS — Z79.899 OTHER LONG TERM (CURRENT) DRUG THERAPY: Primary | ICD-10-CM

## 2025-06-17 DIAGNOSIS — E55.9 VITAMIN D DEFICIENCY: ICD-10-CM

## 2025-06-17 PROCEDURE — 99396 PREV VISIT EST AGE 40-64: CPT | Performed by: FAMILY MEDICINE

## 2025-06-17 RX ORDER — CIPROFLOXACIN 500 MG/1
500 TABLET, FILM COATED ORAL EVERY 12 HOURS SCHEDULED
Qty: 6 TABLET | Refills: 0 | Status: SHIPPED | OUTPATIENT
Start: 2025-06-17 | End: 2025-06-20

## 2025-06-17 NOTE — PROGRESS NOTES
Name: Viral Del Real      : 1980      MRN: [id number]  Encounter Provider: Paul Veliz MD  Encounter Date: 2025   Encounter department: [facility]    :  Assessment & Plan  Other long term (current) drug therapy    Orders:  •  Hemoglobin A1C    Mixed hyperlipidemia    Orders:  •  Lipid Panel with Direct LDL reflex    Vitamin D deficiency    Orders:  •  Vitamin D 25 hydroxy    Well adult exam    Orders:  •  Hemoglobin A1C  •  Comprehensive metabolic panel  •  CBC and differential  •  Lipid Panel with Direct LDL reflex  •  Vitamin D 25 hydroxy    Dysuria    Orders:  •  ciprofloxacin (CIPRO) 500 mg tablet; Take 1 tablet (500 mg total) by mouth every 12 (twelve) hours for 3 days      Assessment & Plan  1. Elevated blood pressure.  - Blood pressure readings have been consistently elevated, with a recent ER visit recording a reading of 140/85.  - Increased stress at work may be contributing to elevated blood pressure.  - Advised to purchase an arm blood pressure monitor and record daily readings for two weeks, varying the time of day each time, and submit these readings via [employer] or in person.  - Recommended dietary modifications, including reducing salt and alcohol intake. Further discussion will be necessary if readings remain consistently high.    2. Penile irritation.  - Likely due to the use of a water-based lubricant, causing irritation primarily at the tip of the urethra.  - Prescribed a 3-day course of Cipro 500 mg twice daily.  - Advised to consume cranberry pills, cranberry juice, and lemon water to aid in flushing out potential irritants.  - Partner is asymptomatic, suggesting localized irritation rather than an infectious cause.    3. Health maintenance.  - Last testosterone level was within normal limits; repeat testing is not necessary at this time.  - Ordered a fasting blood test to monitor overall health, including cholesterol levels, as previous triglycerides were  slightly elevated.  - Weight has increased slightly due to changes in diet and exercise habits; monitoring and lifestyle adjustments recommended.           History of Present Illness     History of Present Illness  The patient presents for evaluation of elevated blood pressure, penile irritation, and acid reflux.    He reports experiencing increased stress at work recently, which he believes may be contributing to his elevated blood pressure. He does not regularly monitor his blood pressure at home. His diet includes prepackaged chicken for lunch and salads, and he consumes alcohol on weekends, typically three beers on either Friday or Saturday.    Approximately two months ago, he experienced a burning sensation and irritation on the tip of his penis after using a water-based lubricant. This was accompanied by redness, which persisted for about two weeks. He suspects this may have been an allergic reaction. The irritation recurs occasionally, particularly when he is dehydrated, but it has been gradually subsiding. He reports no discharge or pain.    He has gained some weight due to frequent travel for work and irregular gym attendance. His sleep pattern is generally good, although he occasionally experiences acid reflux at night, which can cause sweating. He typically wakes up once around 4:00 AM to urinate. He reports no symptoms of erectile dysfunction.    SOCIAL HISTORY  - Drinks alcohol on the weekends, usually three beers on Friday or Saturday     Review of Systems   Constitutional:  Negative for chills and fever.   HENT:  Negative for ear pain and sore throat.    Eyes:  Negative for pain and visual disturbance.   Respiratory:  Negative for cough and shortness of breath.    Cardiovascular:  Negative for chest pain and palpitations.   Gastrointestinal:  Negative for abdominal pain and vomiting.   Genitourinary:  Negative for dysuria and hematuria.   Musculoskeletal:  Negative for arthralgias and back pain.  "  Skin:  Negative for color change and rash.   Neurological:  Negative for seizures and syncope.   All other systems reviewed and are negative.    Objective   /90   Pulse 84   Ht 6' 4\" (1.93 m)   Wt 103 kg (227 lb)   SpO2 98%   BMI 27.63 kg/m²     Physical Exam  - Respiratory: Clear to auscultation, no wheezing, rales or rhonchi  Physical Exam  Vitals and nursing note reviewed.   Constitutional:       Appearance: He is well-developed.   HENT:      Head: Normocephalic and atraumatic.     Eyes:      Conjunctiva/sclera: Conjunctivae normal.      Pupils: Pupils are equal, round, and reactive to light.       Cardiovascular:      Rate and Rhythm: Normal rate and regular rhythm.      Heart sounds: Normal heart sounds.   Pulmonary:      Effort: Pulmonary effort is normal.      Breath sounds: Normal breath sounds. No wheezing or rales.   Abdominal:      General: Bowel sounds are normal. There is no distension.      Palpations: Abdomen is soft.      Tenderness: There is no abdominal tenderness.     Musculoskeletal:         General: No tenderness. Normal range of motion.      Cervical back: Normal range of motion and neck supple.     Skin:     General: Skin is warm and dry.      Findings: No rash.     Neurological:      Mental Status: He is alert and oriented to person, place, and time.      Cranial Nerves: No cranial nerve deficit.      Sensory: No sensory deficit.      Coordination: Coordination normal.     Psychiatric:         Behavior: Behavior normal.         Thought Content: Thought content normal.         Judgment: Judgment normal.       "

## 2025-06-21 LAB
25(OH)D3+25(OH)D2 SERPL-MCNC: 23.6 NG/ML (ref 30–100)
ALBUMIN SERPL-MCNC: 4.5 G/DL (ref 4.1–5.1)
ALP SERPL-CCNC: 60 IU/L (ref 44–121)
ALT SERPL-CCNC: 17 IU/L (ref 0–44)
AST SERPL-CCNC: 25 IU/L (ref 0–40)
BASOPHILS # BLD AUTO: 0 X10E3/UL (ref 0–0.2)
BASOPHILS NFR BLD AUTO: 0 %
BILIRUB SERPL-MCNC: 0.6 MG/DL (ref 0–1.2)
BUN SERPL-MCNC: 18 MG/DL (ref 6–24)
BUN/CREAT SERPL: 16 (ref 9–20)
CALCIUM SERPL-MCNC: 9.1 MG/DL (ref 8.7–10.2)
CHLORIDE SERPL-SCNC: 104 MMOL/L (ref 96–106)
CHOLEST SERPL-MCNC: 192 MG/DL (ref 100–199)
CO2 SERPL-SCNC: 19 MMOL/L (ref 20–29)
CREAT SERPL-MCNC: 1.11 MG/DL (ref 0.76–1.27)
EGFR: 85 ML/MIN/1.73
EOSINOPHIL # BLD AUTO: 0.3 X10E3/UL (ref 0–0.4)
EOSINOPHIL NFR BLD AUTO: 6 %
ERYTHROCYTE [DISTWIDTH] IN BLOOD BY AUTOMATED COUNT: 12.8 % (ref 11.6–15.4)
GLOBULIN SER-MCNC: 2.8 G/DL (ref 1.5–4.5)
GLUCOSE SERPL-MCNC: 99 MG/DL (ref 70–99)
HBA1C MFR BLD: 4.8 % (ref 4.8–5.6)
HCT VFR BLD AUTO: 49.7 % (ref 37.5–51)
HDLC SERPL-MCNC: 35 MG/DL
HGB BLD-MCNC: 16.2 G/DL (ref 13–17.7)
IMM GRANULOCYTES # BLD: 0 X10E3/UL (ref 0–0.1)
IMM GRANULOCYTES NFR BLD: 0 %
LDLC SERPL CALC-MCNC: 114 MG/DL (ref 0–99)
LYMPHOCYTES # BLD AUTO: 1.7 X10E3/UL (ref 0.7–3.1)
LYMPHOCYTES NFR BLD AUTO: 28 %
MCH RBC QN AUTO: 30.6 PG (ref 26.6–33)
MCHC RBC AUTO-ENTMCNC: 32.6 G/DL (ref 31.5–35.7)
MCV RBC AUTO: 94 FL (ref 79–97)
MONOCYTES # BLD AUTO: 0.5 X10E3/UL (ref 0.1–0.9)
MONOCYTES NFR BLD AUTO: 8 %
NEUTROPHILS # BLD AUTO: 3.6 X10E3/UL (ref 1.4–7)
NEUTROPHILS NFR BLD AUTO: 58 %
PLATELET # BLD AUTO: 242 X10E3/UL (ref 150–450)
POTASSIUM SERPL-SCNC: 4.5 MMOL/L (ref 3.5–5.2)
PROT SERPL-MCNC: 7.3 G/DL (ref 6–8.5)
RBC # BLD AUTO: 5.29 X10E6/UL (ref 4.14–5.8)
SODIUM SERPL-SCNC: 138 MMOL/L (ref 134–144)
TRIGL SERPL-MCNC: 248 MG/DL (ref 0–149)
WBC # BLD AUTO: 6.2 X10E3/UL (ref 3.4–10.8)

## 2025-06-23 ENCOUNTER — RESULTS FOLLOW-UP (OUTPATIENT)
Dept: FAMILY MEDICINE CLINIC | Facility: CLINIC | Age: 45
End: 2025-06-23